# Patient Record
Sex: MALE | Race: WHITE | URBAN - METROPOLITAN AREA
[De-identification: names, ages, dates, MRNs, and addresses within clinical notes are randomized per-mention and may not be internally consistent; named-entity substitution may affect disease eponyms.]

---

## 2017-02-03 ENCOUNTER — TRANSFERRED RECORDS (OUTPATIENT)
Dept: HEALTH INFORMATION MANAGEMENT | Facility: CLINIC | Age: 72
End: 2017-02-03

## 2017-06-19 ENCOUNTER — TRANSFERRED RECORDS (OUTPATIENT)
Dept: HEALTH INFORMATION MANAGEMENT | Facility: CLINIC | Age: 72
End: 2017-06-19

## 2017-09-05 ENCOUNTER — TRANSFERRED RECORDS (OUTPATIENT)
Dept: HEALTH INFORMATION MANAGEMENT | Facility: CLINIC | Age: 72
End: 2017-09-05

## 2017-10-10 ENCOUNTER — PRE VISIT (OUTPATIENT)
Dept: UROLOGY | Facility: CLINIC | Age: 72
End: 2017-10-10

## 2017-10-10 NOTE — TELEPHONE ENCOUNTER
Pt with prostate cancer coming in to discuss treatment options. All records available. No need for a call.

## 2017-10-12 PROCEDURE — 00000346 ZZHCL STATISTIC REVIEW OUTSIDE SLIDES TC 88321: Performed by: UROLOGY

## 2017-10-16 ENCOUNTER — OFFICE VISIT (OUTPATIENT)
Dept: UROLOGY | Facility: CLINIC | Age: 72
End: 2017-10-16

## 2017-10-16 VITALS
HEART RATE: 59 BPM | WEIGHT: 186 LBS | HEIGHT: 71 IN | DIASTOLIC BLOOD PRESSURE: 79 MMHG | SYSTOLIC BLOOD PRESSURE: 156 MMHG | BODY MASS INDEX: 26.04 KG/M2

## 2017-10-16 DIAGNOSIS — C61 PROSTATE CANCER (H): Primary | ICD-10-CM

## 2017-10-16 ASSESSMENT — PAIN SCALES - GENERAL: PAINLEVEL: NO PAIN (0)

## 2017-10-16 NOTE — PATIENT INSTRUCTIONS
Decipher testing.    Follow up with Dr. Sanders in about 4-6 weeks to discuss results.    It was a pleasure meeting with you today.  Thank you for allowing me and my team the privilege of caring for you today.  YOU are the reason we are here, and I truly hope we provided you with the excellent service you deserve.  Please let us know if there is anything else we can do for you so that we can be sure you are leaving completely satisfied with your care experience.      DORIAN Ronquillo

## 2017-10-16 NOTE — MR AVS SNAPSHOT
After Visit Summary   10/16/2017    New Melvin III    MRN: [id number]           Patient Information     Date Of Birth          1945        Visit Information        Provider Department      10/16/2017 10:20 AM Loyd Sanders MD [facility] and [facility]        Today's Diagnoses     Prostate cancer (H)    -  1      Care Instructions    Decipher testing.    Follow up with Dr. Sanders in about 4-6 weeks to discuss results.    It was a pleasure meeting with you today.  Thank you for allowing me and my team the privilege of caring for you today.  YOU are the reason we are here, and I truly hope we provided you with the excellent service you deserve.  Please let us know if there is anything else we can do for you so that we can be sure you are leaving completely satisfied with your care experience.      DORIAN Ronquillo          Follow-ups after your visit        Who to contact     Please call your clinic at [phone] to:    Ask questions about your health    Make or cancel appointments    Discuss your medicines    Learn about your test results    Speak to your doctor   If you have compliments or concerns about an experience at your clinic, or if you wish to file a complaint, please contact [facility] Patient Relations at [phone] or email us at [email or web address]         Additional Information About Your Visit        MyChart Information     ComActivity gives you secure access to your electronic health record. If you see a primary care provider, you can also send messages to your care team and make appointments. If you have questions, please call your primary care clinic.  If you do not have a primary care provider, please call [phone] and they will assist you.      ComActivity is an electronic gateway that provides easy, online access to your medical records. With [employer], you can request a clinic  "appointment, read your test results, renew a prescription or communicate with your care team.     To access your existing account, please contact your [facility] or call [phone] for assistance.        Care EveryWhere ID     This is your Care EveryWhere ID. This could be used by other organizations to access your [facility] medical records  [id number]        Your Vitals Were     Pulse Height BMI (Body Mass Index)             59 1.803 m (5' 11\") 25.94 kg/m2          Blood Pressure from Last 3 Encounters:   10/16/17 156/79   04/27/16 166/87   05/13/15 175/74    Weight from Last 3 Encounters:   10/16/17 84.4 kg (186 lb)   04/27/16 85.3 kg (188 lb)   05/13/15 87.7 kg (193 lb 4.8 oz)              We Performed the Following     Decipher Prostate Cancer Test: Clinic Lab Order        Primary Care Provider Office Phone # Fax #    Rafat Schneider [phone]       [facility] [address] [facility] [address]        Equal Access to Services     [facility]: Hadii jose ku hadasho Soomaali, waaxda luqadaha, qaybta kaalmada adeegyabill, jett gamez . So [facility] [phone].    ATENCIÓN: Si habla español, tiene a weston disposición servicios gratuitos de asistencia lingüística. Vicente al [phone].    We comply with applicable federal civil rights laws and [place] laws. We do not discriminate on the basis of race, color, national origin, age, disability, sex, sexual orientation, or gender identity.            Thank you!     Thank you for choosing [facility]OLOG[facility] AND [facility] FOR PROSTATE AND UROLOGIC CANCERS  for your care. Our goal is always to provide you with excellent care. Hearing back from our patients is one way we can continue to improve our services. Please take a few minutes to complete the written survey that you may receive in the mail after your visit with us. Thank you!             Your Updated Medication List - Protect others around " you: Learn how to safely use, store and throw away your medicines at www.disposemymeds.org.          This list is accurate as of: 10/16/17 10:58 AM.  Always use your most recent med list.                   Brand Name Dispense Instructions for use Diagnosis    ASPIRIN PO      Take 81 mg by mouth        bisoprolol-hydrochlorothiazide 10-6.25 MG per tablet    ZIAC     Take 1 tablet by mouth daily        MULTIVITAMIN PO           NAPROXEN PO      Take 500 mg by mouth        SIMVASTATIN PO      Take 20 mg by mouth        VITAMIN C PO      Take 500 mg by mouth

## 2017-10-16 NOTE — NURSING NOTE
"Chief Complaint   Patient presents with     RECHECK     Prostate cancer follow up       Initial Ht 1.803 m (5' 11\")  Wt 84.4 kg (186 lb)  BMI 25.94 kg/m2 Estimated body mass index is 25.94 kg/(m^2) as calculated from the following:    Height as of this encounter: 1.803 m (5' 11\").    Weight as of this encounter: 84.4 kg (186 lb).  Medication Reconciliation: complete     DORIAN Ronquillo    "

## 2017-10-16 NOTE — LETTER
10/16/2017       RE: New Melvin III  [address]  [place] [address]     Dear Colleague,    Thank you for referring your patient, New Melvin III, to the [facility] UROLOGY AND INST FOR PROSTATE AND UROLOGIC CANCERS at [facility]. Please see a copy of my visit note below.    Urology Clinic Note      Date: 10/16/2017  Time: 9:54 AM  Patient: New Melvin III  MRN: [id number]    HPI/Subjective: New Melvin III is a 72 year old male with history of Duncan 3+4 prostate cancer. The patient was last evaluated in clinic on 4/27/16 and at that time he was instructed to obtain a prostate MRI. The MRI imaging was reviewed by Dr. Sanders on 5/16 and his pathology was reviewed by the [facility] pathologist who agreed with the interpretation of Duncan 3+4 disease. With regards to his MRI, we agreed that there were some areas of concern on the right and left side of the prostate that merited repeat MRI fusion guided biopsy.     Most recent biopsy was performed on 6/19/17 which illustrated persistence of Truchas 7 disease in multiple cores and one foci of Truchas 6 disease. The patient presents today to review results of outside MRI, review pathology interpretation by [facility] and discuss options for further management. He was most recently evaluated by Dr. Ortiz on 9/17 who discussed management options with him including AS, surgical intervention and radiation. The patient appears most interest in pursuing brachytherapy or EBRT. The size of his gland (75g) however may preclude this. Of note, the patient has also been evaluated for recent onset ED he believes was associated with his most recen prostate biopsy.      Objective:  There were no vitals taken for this visit.  Gen: In NAD, conversant.  Resp: Breathing non-labored  CV: Extremities warm,.  Abd: Soft, non-distended, non-tender.    Labs/Imaging:     MRI (2/3/17)    - PIRADS 4 lesion 11mm in the left anterior  transition zone, mid gland   - PIRADS 3 lesions 14mm in the right anterior transition zone mid gland   - PIRADS 4 lesion in the right posterior medial peripheral zone between the mid gland and apex measuring 4mm     Assessment & Plan: Johnathan Wheat III is a 72 year old male with history of Duncan 3+4 prostate cancer who presents today to review results of MRI, review imaging and discuss options for management. Treatment options have been previously discussed with patient and he appears most inerested in pursuing EBRT or brachytherapy. Discussed other treatment options for low volume disease including HiFu and cryotherapy and the patient stated he would like to review these other treatment options prior to making a decision.      - Prior MRI reviewed, pathology review pending however there is low likelihood that second interpretation would      - Will have patient obtain Decipher test and follow up in approximately 1 month      This patient was seen & discussed with Dr. Sanders.    John Galarza MD  Urology Resident    Patient seen and examined with the resident.  Visit time 30 minutes and >50% spent in counseling.  I agree with the resident's note and plan of care.       Loyd Sanders MD  Urology Staff    CC  Patient Care Team:  Rafat Schneider as PCP - General (Internal Medicine)  Gregory Garibay MD as MD (Internal Medicine)  Gregory Garibay MD as Referring Physician (Internal Medicine)  Loyd Sanders MD as MD (Urology)  Bay Nevarez MD as MD (Dermatology)  SELF, REFERRED    Copy to patient  JOHNATHAN WHEAT III  [address]  [place] [address]

## 2017-10-16 NOTE — PROGRESS NOTES
Urology Clinic Note      Date: 10/16/2017  Time: 9:54 AM  Patient: New Melvin III  MRN: [id number]    HPI/Subjective: New Melvin III is a 72 year old male with history of Duncan 3+4 prostate cancer. The patient was last evaluated in clinic on 4/27/16 and at that time he was instructed to obtain a prostate MRI. The MRI imaging was reviewed by Dr. Sanders on 5/16 and his pathology was reviewed by the [facility] pathologist who agreed with the interpretation of Keystone 3+4 disease. With regards to his MRI, we agreed that there were some areas of concern on the right and left side of the prostate that merited repeat MRI fusion guided biopsy.     Most recent biopsy was performed on 6/19/17 which illustrated persistence of Keystone 7 disease in multiple cores and one foci of Duncan 6 disease. The patient presents today to review results of outside MRI, review pathology interpretation by [facility] and discuss options for further management. He was most recently evaluated by Dr. Ortiz on 9/17 who discussed management options with him including AS, surgical intervention and radiation. The patient appears most interest in pursuing brachytherapy or EBRT. The size of his gland (75g) however may preclude this. Of note, the patient has also been evaluated for recent onset ED he believes was associated with his most recen prostate biopsy.      Objective:  There were no vitals taken for this visit.  Gen: In NAD, conversant.  Resp: Breathing non-labored  CV: Extremities warm,.  Abd: Soft, non-distended, non-tender.    Labs/Imaging:     MRI (2/3/17)    - PIRADS 4 lesion 11mm in the left anterior transition zone, mid gland   - PIRADS 3 lesions 14mm in the right anterior transition zone mid gland   - PIRADS 4 lesion in the right posterior medial peripheral zone between the mid gland and apex measuring 4mm     Assessment & Plan: New Melvin III is a 72 year old male with history of Keystone 3+4 prostate cancer who presents  today to review results of MRI, review imaging and discuss options for management. Treatment options have been previously discussed with patient and he appears most inerested in pursuing EBRT or brachytherapy. Discussed other treatment options for low volume disease including HiFu and cryotherapy and the patient stated he would like to review these other treatment options prior to making a decision.      - Prior MRI reviewed, pathology review pending however there is low likelihood that second interpretation would      - Will have patient obtain Decipher test and follow up in approximately 1 month      This patient was seen & discussed with Dr. Sanders.    John Galarza MD  Urology Resident    Patient seen and examined with the resident.  Visit time 30 minutes and >50% spent in counseling.  I agree with the resident's note and plan of care.       Loyd Sanders MD  Urology Staff    CC  Patient Care Team:  Rafat Schneider as PCP - General (Internal Medicine)  Gregory Garibay MD as MD (Internal Medicine)  Gregory Garibay MD as Referring Physician (Internal Medicine)  Loyd Sanders MD as MD (Urology)  Bay Nevarez MD as MD (Dermatology)  SELF, REFERRED    Copy to patient  JOHNATHAN LOPES WHEAT III  [address]  [place] [address]

## 2017-10-27 PROBLEM — C61 PROSTATE CANCER (H): Status: ACTIVE | Noted: 2017-10-27

## 2017-11-09 ENCOUNTER — MYC MEDICAL ADVICE (OUTPATIENT)
Dept: UROLOGY | Facility: CLINIC | Age: 72
End: 2017-11-09

## 2017-11-10 LAB — COPATH REPORT: NORMAL

## 2017-11-20 ENCOUNTER — MYC MEDICAL ADVICE (OUTPATIENT)
Dept: UROLOGY | Facility: CLINIC | Age: 72
End: 2017-11-20

## 2020-03-10 ENCOUNTER — HEALTH MAINTENANCE LETTER (OUTPATIENT)
Age: 75
End: 2020-03-10

## 2020-12-27 ENCOUNTER — HEALTH MAINTENANCE LETTER (OUTPATIENT)
Age: 75
End: 2020-12-27

## 2021-04-24 ENCOUNTER — HEALTH MAINTENANCE LETTER (OUTPATIENT)
Age: 76
End: 2021-04-24

## 2021-10-03 ENCOUNTER — HEALTH MAINTENANCE LETTER (OUTPATIENT)
Age: 76
End: 2021-10-03

## 2022-05-15 ENCOUNTER — HEALTH MAINTENANCE LETTER (OUTPATIENT)
Age: 77
End: 2022-05-15

## 2022-09-11 ENCOUNTER — HEALTH MAINTENANCE LETTER (OUTPATIENT)
Age: 77
End: 2022-09-11

## 2023-06-03 ENCOUNTER — HEALTH MAINTENANCE LETTER (OUTPATIENT)
Age: 78
End: 2023-06-03